# Patient Record
Sex: FEMALE | Race: WHITE | NOT HISPANIC OR LATINO | Employment: PART TIME | URBAN - NONMETROPOLITAN AREA
[De-identification: names, ages, dates, MRNs, and addresses within clinical notes are randomized per-mention and may not be internally consistent; named-entity substitution may affect disease eponyms.]

---

## 2022-10-06 ENCOUNTER — OFFICE VISIT (OUTPATIENT)
Dept: INTERNAL MEDICINE | Facility: CLINIC | Age: 27
End: 2022-10-06

## 2022-10-06 VITALS
DIASTOLIC BLOOD PRESSURE: 70 MMHG | WEIGHT: 144 LBS | TEMPERATURE: 97.3 F | BODY MASS INDEX: 22.6 KG/M2 | OXYGEN SATURATION: 99 % | HEART RATE: 88 BPM | HEIGHT: 67 IN | SYSTOLIC BLOOD PRESSURE: 124 MMHG

## 2022-10-06 DIAGNOSIS — F51.01 PRIMARY INSOMNIA: ICD-10-CM

## 2022-10-06 DIAGNOSIS — N92.1 MENORRHAGIA WITH IRREGULAR CYCLE: ICD-10-CM

## 2022-10-06 DIAGNOSIS — Z76.89 ENCOUNTER TO ESTABLISH CARE: Primary | ICD-10-CM

## 2022-10-06 DIAGNOSIS — Z11.59 NEED FOR HEPATITIS C SCREENING TEST: ICD-10-CM

## 2022-10-06 DIAGNOSIS — G44.209 TENSION HEADACHE: ICD-10-CM

## 2022-10-06 DIAGNOSIS — R07.1 CHEST PAIN VARYING WITH BREATHING: ICD-10-CM

## 2022-10-06 PROCEDURE — 99385 PREV VISIT NEW AGE 18-39: CPT

## 2022-10-06 RX ORDER — AMITRIPTYLINE HYDROCHLORIDE 10 MG/1
10 TABLET, FILM COATED ORAL NIGHTLY
Qty: 30 TABLET | Refills: 0 | Status: SHIPPED | OUTPATIENT
Start: 2022-10-06

## 2022-10-06 NOTE — PROGRESS NOTES
Subjective   Fernanda Vang is a 27 y.o. female.   Chief Complaint   Patient presents with   • Establish Care   • Annual Exam     Routine checkup        History of Present Illness   Fernanda presents here today to establish care.  She reports that she is fairly new to the area, moved here from [place] about a year ago.  She reports no significant childhood illnesses or conditions, denies any current medical diagnoses.  Reports that her father is in the medical field and as a child she was given an inhaler to use  She states that she does work out pretty regularly however mostly lifts weights, does about 15 minutes of cardio weekly with activities such as running, swimming.  She states that when she does this she's experiencing a ache in her trap area, left upper lung.  This does not occur when she is lifting weights, she does weight work with upper body and never has any problems.  She denies any overt chest pains or shortness of air that seems out of the ordinary while doing cardio.  She mentions that she has had insomnia issues for quite some time however in the last year more so.  She states that her problem is falling asleep, typically takes 30 minutes to an hour, some nights she is up all night.  She states that she will sometimes wake up anxiously, most recent episode she was searching around in the bed thinking that there was something in the bed.  She has noted increased issues with insomnia when work is especially stressful or she has a big event coming up.  She admits to sleepwalking as a child.  She has tried melatonin in the past and it does not seem to help and also makes her limbs feel tingly.  She utilizes Benadryl only if experiencing sinus issues.  Does admit to having fairly often occurrences of headaches, is more severe when she is having allergy issues and increased sinus pressure, has also noted can occur with dehydration or lack of food, will occasionally have a headache that lasts about 3 days,  "does not feel that it is exactly a migraine as she has a father that had severe migraines and she notes that is not this bad.  She also mentions that she would like to get her hormones checked, does take a \"bio-cherie\" supplement to help with menstrual issues such as cramping and breast tenderness, states that she is fairly regular.  Reports that she had a Pap about a year and a half ago that was normal.  Denies any other issues or concerns.    The following portions of the patient's history were reviewed and updated as appropriate: allergies, current medications, past family history, past medical history, past social history, past surgical history and problem list.    Review of Systems    Objective   Past Medical History:   Diagnosis Date   • Headache       History reviewed. No pertinent surgical history.     Current Outpatient Medications:   •  amitriptyline (ELAVIL) 10 MG tablet, Take 1 tablet by mouth Every Night., Disp: 30 tablet, Rfl: 0      /70 (BP Location: Left arm, Patient Position: Sitting, Cuff Size: Adult)   Pulse 88   Temp 97.3 °F (36.3 °C) (Temporal)   Ht 170.2 cm (67\")   Wt 65.3 kg (144 lb)   LMP 09/20/2022 (Exact Date)   SpO2 99%   Breastfeeding No   BMI 22.55 kg/m²      Body mass index is 22.55 kg/m².  BMI is within normal parameters. No other follow-up for BMI required.       Physical Exam  Vitals and nursing note reviewed.   Constitutional:       General: She is not in acute distress.     Appearance: Normal appearance. She is normal weight. She is not ill-appearing, toxic-appearing or diaphoretic.   HENT:      Head: Normocephalic and atraumatic.      Right Ear: Tympanic membrane, ear canal and external ear normal. There is no impacted cerumen.      Left Ear: Tympanic membrane, ear canal and external ear normal. There is no impacted cerumen.      Nose: Nose normal. No congestion or rhinorrhea.      Mouth/Throat:      Mouth: Mucous membranes are moist.      Pharynx: Oropharynx is clear. " No oropharyngeal exudate or posterior oropharyngeal erythema.   Eyes:      General:         Right eye: No discharge.         Left eye: No discharge.      Extraocular Movements: Extraocular movements intact.      Conjunctiva/sclera: Conjunctivae normal.      Pupils: Pupils are equal, round, and reactive to light.   Neck:      Thyroid: No thyroid mass, thyromegaly or thyroid tenderness.      Vascular: No carotid bruit.   Cardiovascular:      Rate and Rhythm: Normal rate and regular rhythm.      Pulses: Normal pulses.      Heart sounds: Normal heart sounds.   Pulmonary:      Effort: Pulmonary effort is normal. No respiratory distress.      Breath sounds: Normal breath sounds. No wheezing or rales.   Abdominal:      General: Bowel sounds are normal.      Palpations: Abdomen is soft.   Musculoskeletal:         General: Normal range of motion.      Cervical back: Normal range of motion and neck supple. No rigidity or tenderness.      Right lower leg: No edema.      Left lower leg: No edema.   Lymphadenopathy:      Cervical: No cervical adenopathy.   Skin:     General: Skin is warm and dry.      Capillary Refill: Capillary refill takes less than 2 seconds.      Findings: No lesion or rash.   Neurological:      General: No focal deficit present.      Mental Status: She is alert and oriented to person, place, and time. Mental status is at baseline.      Sensory: No sensory deficit.      Motor: No weakness.      Coordination: Coordination normal.   Psychiatric:         Mood and Affect: Mood normal.         Behavior: Behavior normal.         Thought Content: Thought content normal.         Judgment: Judgment normal.               Assessment & Plan   Diagnoses and all orders for this visit:    1. Encounter to establish care (Primary)  -     TSH; Future  -     Comprehensive Metabolic Panel  -     CBC & Differential  -     Lipid Panel  -     Uric Acid  -     Urinalysis With Microscopic - Urine, Clean Catch    2. Menorrhagia with  irregular cycle  -     Estradiol; Future  -     Follicle stimulating hormone; Future  -     Luteinizing hormone; Future  -     Prolactin; Future  -     Testosterone Free Direct; Future  -     TSH; Future    3. Tension headache  -     amitriptyline (ELAVIL) 10 MG tablet; Take 1 tablet by mouth Every Night.  Dispense: 30 tablet; Refill: 0    4. Primary insomnia  -     amitriptyline (ELAVIL) 10 MG tablet; Take 1 tablet by mouth Every Night.  Dispense: 30 tablet; Refill: 0    5. Chest pain varying with breathing  -     Full Pulmonary Function Test With Bronchodilator; Future    6. Need for hepatitis C screening test  -     Hepatitis C Antibody               Plan of care reviewed with Fernanda  We will proceed with getting baseline labs as well as hormonal labs, will communicate results as they are available.  Blood pressure today is 124/70, heart rate 88  We will trial Elavil and a low-dose, she will communicate results via [employer] message or or she can call the office.  I advise giving it 1 week, if no positive results please let me know and we will consider another formal logical therapy.  Elavil may also help with the headaches.  Will have a pulmonary function test performed given her complaints of discomfort while performing cardiovascular exercises and given reports of using inhaler as a child.  She consents to doing hepatitis C screening  For now we will schedule her for a year out for another annual physical, can always be seen before this as needed.

## 2022-10-07 ENCOUNTER — PATIENT ROUNDING (BHMG ONLY) (OUTPATIENT)
Dept: INTERNAL MEDICINE | Facility: CLINIC | Age: 27
End: 2022-10-07

## 2022-10-07 LAB
ALBUMIN SERPL-MCNC: 5 G/DL (ref 3.5–5.2)
ALBUMIN/GLOB SERPL: 2.6 G/DL
ALP SERPL-CCNC: 40 U/L (ref 39–117)
ALT SERPL-CCNC: 10 U/L (ref 1–33)
APPEARANCE UR: CLEAR
AST SERPL-CCNC: 19 U/L (ref 1–32)
BACTERIA #/AREA URNS HPF: ABNORMAL /HPF
BASOPHILS # BLD AUTO: 0.03 10*3/MM3 (ref 0–0.2)
BASOPHILS NFR BLD AUTO: 0.5 % (ref 0–1.5)
BILIRUB SERPL-MCNC: 0.5 MG/DL (ref 0–1.2)
BILIRUB UR QL STRIP: NEGATIVE
BUN SERPL-MCNC: 10 MG/DL (ref 6–20)
BUN/CREAT SERPL: 12.8 (ref 7–25)
CALCIUM SERPL-MCNC: 9.4 MG/DL (ref 8.6–10.5)
CASTS URNS MICRO: ABNORMAL
CHLORIDE SERPL-SCNC: 100 MMOL/L (ref 98–107)
CHOLEST SERPL-MCNC: 184 MG/DL (ref 0–200)
CO2 SERPL-SCNC: 26.4 MMOL/L (ref 22–29)
COLOR UR: YELLOW
CREAT SERPL-MCNC: 0.78 MG/DL (ref 0.57–1)
EGFRCR SERPLBLD CKD-EPI 2021: 106.9 ML/MIN/1.73
EOSINOPHIL # BLD AUTO: 0.11 10*3/MM3 (ref 0–0.4)
EOSINOPHIL NFR BLD AUTO: 2 % (ref 0.3–6.2)
EPI CELLS #/AREA URNS HPF: ABNORMAL /HPF
ERYTHROCYTE [DISTWIDTH] IN BLOOD BY AUTOMATED COUNT: 11.7 % (ref 12.3–15.4)
GLOBULIN SER CALC-MCNC: 1.9 GM/DL
GLUCOSE SERPL-MCNC: 90 MG/DL (ref 65–99)
GLUCOSE UR QL STRIP: NEGATIVE
HCT VFR BLD AUTO: 42 % (ref 34–46.6)
HDLC SERPL-MCNC: 58 MG/DL (ref 40–60)
HGB BLD-MCNC: 14.4 G/DL (ref 12–15.9)
HGB UR QL STRIP: NEGATIVE
IMM GRANULOCYTES # BLD AUTO: 0.01 10*3/MM3 (ref 0–0.05)
IMM GRANULOCYTES NFR BLD AUTO: 0.2 % (ref 0–0.5)
KETONES UR QL STRIP: NEGATIVE
LDLC SERPL CALC-MCNC: 113 MG/DL (ref 0–100)
LEUKOCYTE ESTERASE UR QL STRIP: NEGATIVE
LYMPHOCYTES # BLD AUTO: 2.19 10*3/MM3 (ref 0.7–3.1)
LYMPHOCYTES NFR BLD AUTO: 40.1 % (ref 19.6–45.3)
MCH RBC QN AUTO: 30 PG (ref 26.6–33)
MCHC RBC AUTO-ENTMCNC: 34.3 G/DL (ref 31.5–35.7)
MCV RBC AUTO: 87.5 FL (ref 79–97)
MONOCYTES # BLD AUTO: 0.49 10*3/MM3 (ref 0.1–0.9)
MONOCYTES NFR BLD AUTO: 9 % (ref 5–12)
NEUTROPHILS # BLD AUTO: 2.63 10*3/MM3 (ref 1.7–7)
NEUTROPHILS NFR BLD AUTO: 48.2 % (ref 42.7–76)
NITRITE UR QL STRIP: NEGATIVE
NRBC BLD AUTO-RTO: 0 /100 WBC (ref 0–0.2)
PH UR STRIP: 7.5 [PH] (ref 5–8)
PLATELET # BLD AUTO: 238 10*3/MM3 (ref 140–450)
POTASSIUM SERPL-SCNC: 4.3 MMOL/L (ref 3.5–5.2)
PROT SERPL-MCNC: 6.9 G/DL (ref 6–8.5)
PROT UR QL STRIP: NEGATIVE
RBC # BLD AUTO: 4.8 10*6/MM3 (ref 3.77–5.28)
RBC #/AREA URNS HPF: ABNORMAL /HPF
SODIUM SERPL-SCNC: 137 MMOL/L (ref 136–145)
SP GR UR STRIP: 1.01 (ref 1–1.03)
TRIGL SERPL-MCNC: 70 MG/DL (ref 0–150)
URATE SERPL-MCNC: 4.7 MG/DL (ref 2.4–5.7)
UROBILINOGEN UR STRIP-MCNC: NORMAL MG/DL
VLDLC SERPL CALC-MCNC: 13 MG/DL (ref 5–40)
WBC # BLD AUTO: 5.46 10*3/MM3 (ref 3.4–10.8)
WBC #/AREA URNS HPF: ABNORMAL /HPF

## 2022-10-07 NOTE — PROGRESS NOTES
October 7, 2022    Hello, may I speak with Fernanda Vang?    My name is Laxmi    I am  with [facility]  [address] [place] [address] [address] [place] [address] [phone].    Before we get started may I verify your date of birth? 1995    I am calling to officially welcome you to our practice and ask about your recent visit. Is this a good time to talk? No      Patient did not answer, no voicemail available.        Thank you, and have a great day.

## 2022-10-07 NOTE — PROGRESS NOTES
It is my fault but the hormonal labs were put in for future, thus they were not drawn, will need to come back in at her convenience to have these done.  Urinalysis is completely clear, microscopic does note 1+ bacteria, I want to ensure there are no symptoms of UTI?  If she does have any symptoms such as increased frequency, pelvic discomfort, dysuria, or flank pain please have her leave a urine with culture when she returns.  Uric acid is normal  Total cholesterol is normal, there is a slight elevation in LDL, HDL is in great range, would continue with current physical activity level, would encourage avoidance of excessive intake of red meats, fried foods and fast foods.  CBC is within normal range, RDW is slightly low but not significantly so.  Metabolic panel is completely normal range.

## 2022-11-22 ENCOUNTER — APPOINTMENT (OUTPATIENT)
Dept: PULMONOLOGY | Facility: HOSPITAL | Age: 27
End: 2022-11-22

## 2023-10-13 ENCOUNTER — LAB (OUTPATIENT)
Dept: INTERNAL MEDICINE | Facility: CLINIC | Age: 28
End: 2023-10-13
Payer: COMMERCIAL

## 2023-10-13 DIAGNOSIS — Z11.59 NEED FOR HEPATITIS C SCREENING TEST: ICD-10-CM

## 2023-10-13 DIAGNOSIS — Z00.00 WELLNESS EXAMINATION: ICD-10-CM

## 2023-10-14 LAB
ALBUMIN SERPL-MCNC: 4.7 G/DL (ref 3.5–5.2)
ALBUMIN/GLOB SERPL: 2.4 G/DL
ALP SERPL-CCNC: 41 U/L (ref 39–117)
ALT SERPL-CCNC: 11 U/L (ref 1–33)
APPEARANCE UR: CLEAR
AST SERPL-CCNC: 17 U/L (ref 1–32)
BACTERIA #/AREA URNS HPF: ABNORMAL /HPF
BASOPHILS # BLD AUTO: 0.03 10*3/MM3 (ref 0–0.2)
BASOPHILS NFR BLD AUTO: 0.6 % (ref 0–1.5)
BILIRUB SERPL-MCNC: 0.4 MG/DL (ref 0–1.2)
BILIRUB UR QL STRIP: NEGATIVE
BUN SERPL-MCNC: 8 MG/DL (ref 6–20)
BUN/CREAT SERPL: 10.8 (ref 7–25)
CALCIUM SERPL-MCNC: 9.6 MG/DL (ref 8.6–10.5)
CASTS URNS MICRO: ABNORMAL
CHLORIDE SERPL-SCNC: 103 MMOL/L (ref 98–107)
CHOLEST SERPL-MCNC: 175 MG/DL (ref 0–200)
CO2 SERPL-SCNC: 24.4 MMOL/L (ref 22–29)
COLOR UR: YELLOW
CREAT SERPL-MCNC: 0.74 MG/DL (ref 0.57–1)
EGFRCR SERPLBLD CKD-EPI 2021: 113.2 ML/MIN/1.73
EOSINOPHIL # BLD AUTO: 0.12 10*3/MM3 (ref 0–0.4)
EOSINOPHIL NFR BLD AUTO: 2.3 % (ref 0.3–6.2)
EPI CELLS #/AREA URNS HPF: ABNORMAL /HPF
ERYTHROCYTE [DISTWIDTH] IN BLOOD BY AUTOMATED COUNT: 11.6 % (ref 12.3–15.4)
GLOBULIN SER CALC-MCNC: 2 GM/DL
GLUCOSE SERPL-MCNC: 84 MG/DL (ref 65–99)
GLUCOSE UR QL STRIP: NEGATIVE
HCT VFR BLD AUTO: 43.1 % (ref 34–46.6)
HCV IGG SERPL QL IA: NON REACTIVE
HDLC SERPL-MCNC: 51 MG/DL (ref 40–60)
HGB BLD-MCNC: 14.6 G/DL (ref 12–15.9)
HGB UR QL STRIP: NEGATIVE
IMM GRANULOCYTES # BLD AUTO: 0.01 10*3/MM3 (ref 0–0.05)
IMM GRANULOCYTES NFR BLD AUTO: 0.2 % (ref 0–0.5)
KETONES UR QL STRIP: NEGATIVE
LDLC SERPL CALC-MCNC: 113 MG/DL (ref 0–100)
LEUKOCYTE ESTERASE UR QL STRIP: NEGATIVE
LYMPHOCYTES # BLD AUTO: 1.94 10*3/MM3 (ref 0.7–3.1)
LYMPHOCYTES NFR BLD AUTO: 37.5 % (ref 19.6–45.3)
MCH RBC QN AUTO: 30 PG (ref 26.6–33)
MCHC RBC AUTO-ENTMCNC: 33.9 G/DL (ref 31.5–35.7)
MCV RBC AUTO: 88.5 FL (ref 79–97)
MONOCYTES # BLD AUTO: 0.4 10*3/MM3 (ref 0.1–0.9)
MONOCYTES NFR BLD AUTO: 7.7 % (ref 5–12)
NEUTROPHILS # BLD AUTO: 2.67 10*3/MM3 (ref 1.7–7)
NEUTROPHILS NFR BLD AUTO: 51.7 % (ref 42.7–76)
NITRITE UR QL STRIP: NEGATIVE
NRBC BLD AUTO-RTO: 0 /100 WBC (ref 0–0.2)
PH UR STRIP: 7.5 [PH] (ref 5–8)
PLATELET # BLD AUTO: 273 10*3/MM3 (ref 140–450)
POTASSIUM SERPL-SCNC: 4 MMOL/L (ref 3.5–5.2)
PROT SERPL-MCNC: 6.7 G/DL (ref 6–8.5)
PROT UR QL STRIP: NEGATIVE
RBC # BLD AUTO: 4.87 10*6/MM3 (ref 3.77–5.28)
RBC #/AREA URNS HPF: ABNORMAL /HPF
SODIUM SERPL-SCNC: 137 MMOL/L (ref 136–145)
SP GR UR STRIP: 1.01 (ref 1–1.03)
TRIGL SERPL-MCNC: 57 MG/DL (ref 0–150)
TSH SERPL DL<=0.005 MIU/L-ACNC: 1.44 UIU/ML (ref 0.27–4.2)
UROBILINOGEN UR STRIP-MCNC: NORMAL MG/DL
VLDLC SERPL CALC-MCNC: 11 MG/DL (ref 5–40)
WBC # BLD AUTO: 5.17 10*3/MM3 (ref 3.4–10.8)
WBC #/AREA URNS HPF: ABNORMAL /HPF

## 2023-11-06 ENCOUNTER — OFFICE VISIT (OUTPATIENT)
Dept: INTERNAL MEDICINE | Facility: CLINIC | Age: 28
End: 2023-11-06
Payer: COMMERCIAL

## 2023-11-06 VITALS
WEIGHT: 142.8 LBS | OXYGEN SATURATION: 98 % | SYSTOLIC BLOOD PRESSURE: 118 MMHG | DIASTOLIC BLOOD PRESSURE: 72 MMHG | HEART RATE: 95 BPM | BODY MASS INDEX: 22.41 KG/M2 | HEIGHT: 67 IN | TEMPERATURE: 97.5 F

## 2023-11-06 DIAGNOSIS — G44.209 TENSION HEADACHE: ICD-10-CM

## 2023-11-06 DIAGNOSIS — Z00.00 ENCOUNTER FOR ANNUAL PHYSICAL EXAM: Primary | ICD-10-CM

## 2023-11-06 DIAGNOSIS — M53.3 CHRONIC RIGHT SI JOINT PAIN: ICD-10-CM

## 2023-11-06 DIAGNOSIS — F41.9 ANXIETY: ICD-10-CM

## 2023-11-06 DIAGNOSIS — Z12.4 ENCOUNTER FOR PAPANICOLAOU SMEAR OF CERVIX: ICD-10-CM

## 2023-11-06 DIAGNOSIS — R20.8 BURNING SENSATION OF LOWER EXTREMITY: ICD-10-CM

## 2023-11-06 DIAGNOSIS — F51.01 PRIMARY INSOMNIA: ICD-10-CM

## 2023-11-06 DIAGNOSIS — I83.90 VARICOSE VEINS OF CALF: ICD-10-CM

## 2023-11-06 DIAGNOSIS — G89.29 CHRONIC RIGHT SI JOINT PAIN: ICD-10-CM

## 2023-11-06 RX ORDER — HYDROXYZINE HYDROCHLORIDE 25 MG/1
25 TABLET, FILM COATED ORAL NIGHTLY PRN
Qty: 30 TABLET | Refills: 1 | Status: SHIPPED | OUTPATIENT
Start: 2023-11-06

## 2023-11-06 RX ORDER — AMITRIPTYLINE HYDROCHLORIDE 10 MG/1
20 TABLET, FILM COATED ORAL NIGHTLY
Qty: 90 TABLET | Refills: 1 | Status: SHIPPED | OUTPATIENT
Start: 2023-11-06

## 2023-11-06 NOTE — PROGRESS NOTES
Subjective   Fernanda Vang is a 28 y.o. female.   Chief Complaint   Patient presents with    Annual Exam     Labs printed     Referral     Patient is requesting a referral for PT due to R hip pain that has been present for a year. States the pain would occur with exertion.   States symptoms have improved but states if she has an episode again she would like to trial PT.     Med Refill     Patient would like a refill on Elavil.     Varicose Veins     Patient complains of varicose veins located behind her L knee.   States she has trialed compression stockings and this will give some relief.     Gynecologic Exam       History of Present Illness   Fernanda presents today for annual wellness exam.  She has various issues/concerns she would like to address today.  She explains that in the last year she has had recurrent right-sided SI nerve pain, typically triggered by certain exercises, works out frequently, exercises such as RDLs, or being on her legs for an extended period of time seem to trigger it.  She has had little to no therapeutic effect with chiropractic intervention, has noted some improvement with massage, she would be interested in trying physical therapy.    She states that she has continued to have issues with insomnia, states that she never really gave the Elavil a continuous time period to see if this would help, she would like this prescription renewed with the option of taking an additional dosage if needed to help with her insomnia.  She is also previously had issues with tension headaches of which this has been therapeutic for her.  She also mentions that if she goes several nights without sleeping well she has increased anxiety about not being able to sleep well, is wondering if she could have hydroxyzine as needed for treatment of this.    She states that since working at her current job she has been on her legs for more extended periods of time than usual, as result she has noted varicose veins on  "bilateral lower extremities, they are painful at times and cause a burning sensation.  She has noted some improvement by using a compressive knee device that she wears during the day but will be interested to see if there are any further treatment options via vascular surgery.    She also reports that she is overdue for Pap and is wondering if this could be completed today.  She denies any current gynecological issues or concerns.  She reports family history of breast cancer in grandmother.    The following portions of the patient's history were reviewed and updated as appropriate: allergies, current medications, past family history, past medical history, past social history, past surgical history and problem list.    Review of Systems    Objective   Past Medical History:   Diagnosis Date    Headache       History reviewed. No pertinent surgical history.     Current Outpatient Medications:     amitriptyline (ELAVIL) 10 MG tablet, Take 2 tablets by mouth Every Night., Disp: 90 tablet, Rfl: 1    hydrOXYzine (ATARAX) 25 MG tablet, Take 1 tablet by mouth At Night As Needed for Anxiety., Disp: 30 tablet, Rfl: 1      /72 (BP Location: Left arm, Patient Position: Sitting, Cuff Size: Adult)   Pulse 95   Temp 97.5 °F (36.4 °C) (Temporal)   Ht 170.2 cm (67\")   Wt 64.8 kg (142 lb 12.8 oz)   SpO2 98%   BMI 22.37 kg/m²      Body mass index is 22.37 kg/m².  BMI is within normal parameters. No other follow-up for BMI required.       Physical Exam  Vitals and nursing note reviewed. Exam conducted with a chaperone present.   Constitutional:       General: She is not in acute distress.     Appearance: Normal appearance. She is normal weight. She is not ill-appearing, toxic-appearing or diaphoretic.   HENT:      Head: Normocephalic and atraumatic.      Right Ear: Tympanic membrane, ear canal and external ear normal. There is no impacted cerumen.      Left Ear: Tympanic membrane, ear canal and external ear normal. There is " no impacted cerumen.      Nose: Nose normal.   Eyes:      Extraocular Movements: Extraocular movements intact.      Conjunctiva/sclera: Conjunctivae normal.      Pupils: Pupils are equal, round, and reactive to light.   Neck:      Thyroid: No thyroid mass, thyromegaly or thyroid tenderness.      Vascular: No carotid bruit.   Cardiovascular:      Rate and Rhythm: Normal rate and regular rhythm.      Pulses: Normal pulses.      Heart sounds: Normal heart sounds.   Pulmonary:      Effort: Pulmonary effort is normal.      Breath sounds: Normal breath sounds.   Chest:   Breasts:     Breasts are symmetrical.      Right: Normal.      Left: Normal.   Abdominal:      General: Bowel sounds are normal.      Palpations: Abdomen is soft.      Hernia: There is no hernia in the left inguinal area or right inguinal area.   Genitourinary:     Exam position: Lithotomy position.      Labia:         Right: No rash, tenderness, lesion or injury.         Left: No rash, tenderness, lesion or injury.       Urethra: No prolapse, urethral pain, urethral swelling or urethral lesion.      Vagina: Normal.      Cervix: Normal.      Uterus: Normal.       Adnexa: Right adnexa normal and left adnexa normal.      Rectum: Normal.   Musculoskeletal:         General: No tenderness (SI joint not bothering today). Normal range of motion.      Cervical back: Normal range of motion and neck supple.      Right lower leg: No edema.      Left lower leg: No edema.   Lymphadenopathy:      Upper Body:      Right upper body: No supraclavicular, axillary or pectoral adenopathy.      Left upper body: No supraclavicular, axillary or pectoral adenopathy.      Lower Body: No right inguinal adenopathy. No left inguinal adenopathy.   Skin:     General: Skin is warm and dry.      Capillary Refill: Capillary refill takes less than 2 seconds.      Comments: Apparent varicose veins bilateral lower extremities   Neurological:      General: No focal deficit present.       Mental Status: She is alert and oriented to person, place, and time. Mental status is at baseline.      Motor: No weakness.      Gait: Gait normal.   Psychiatric:         Mood and Affect: Mood normal.         Behavior: Behavior normal.         Thought Content: Thought content normal.         Judgment: Judgment normal.               Assessment & Plan   Diagnoses and all orders for this visit:    1. Encounter for annual physical exam (Primary)    2. Primary insomnia  -     amitriptyline (ELAVIL) 10 MG tablet; Take 2 tablets by mouth Every Night.  Dispense: 90 tablet; Refill: 1    3. Tension headache  -     amitriptyline (ELAVIL) 10 MG tablet; Take 2 tablets by mouth Every Night.  Dispense: 90 tablet; Refill: 1    4. Anxiety  -     hydrOXYzine (ATARAX) 25 MG tablet; Take 1 tablet by mouth At Night As Needed for Anxiety.  Dispense: 30 tablet; Refill: 1    5. Chronic right SI joint pain  -     Ambulatory Referral to Physical Therapy    6. Encounter for Papanicolaou smear of cervix  -     IGP,rfx Aptima HPV All Pth    7. Varicose veins of calf  -     Ambulatory Referral to Vascular Surgery    8. Burning sensation of lower extremity  -     Ambulatory Referral to Vascular Surgery               Plan of care and recent lab results reviewed with Fernanda  Overall labs are unremarkable, slight elevation in LDL, recommended intake of saturated fats to be limited, continue to incorporate adequate amounts of healthy fat and routine exercise.  We will proceed with trialing the Elavil up to 20 mg nightly for treatment of insomnia as well as tension headaches, she has been encouraged to reach out sooner rather than later if therapy is subtherapeutic or if there is any side effects that are bothersome such as brain fog in the morning, dry mouth etc.  May utilize hydroxyzine as needed for anxiety  We will proceed with referral to physical therapy for additional evaluation and management of right SI joint pain, I would also recommend  continue with current measures which include massage therapy.  Overall exam was unremarkable, I did recommend performing self breast exams once monthly.  We will communicate results of Pap once available.  Continue with routine orthodontic and ophthalmology exams  Continue with use of sunscreen when exposed to prolonged sunlight, she did request referral to dermatology for overall skin evaluation, she was given a list of dermatologist available in [place], advised of at least 3 months wait time likely.  Will refer to vascular surgery Dr. Forman for additional evaluation and management of varicose veins of which she is symptomatic with intermittent burning.  Encouraged to return to the office as needed, for questions/concerns via MyChart as well, otherwise return in 1 year for annual physical.    Please note that portions of this note were completed with a voice recognition program.     Electronically signed by JOSE Savage, 11/06/23, 11:20 CST.

## 2023-11-09 LAB
CONV .: NORMAL
CYTOLOGIST CVX/VAG CYTO: NORMAL
CYTOLOGY CVX/VAG DOC CYTO: NORMAL
CYTOLOGY CVX/VAG DOC THIN PREP: NORMAL
DX ICD CODE: NORMAL
HIV 1 & 2 AB SER-IMP: NORMAL
OTHER STN SPEC: NORMAL
STAT OF ADQ CVX/VAG CYTO-IMP: NORMAL

## 2023-12-07 DIAGNOSIS — F41.9 ANXIETY: ICD-10-CM

## 2023-12-08 RX ORDER — HYDROXYZINE HYDROCHLORIDE 25 MG/1
25 TABLET, FILM COATED ORAL NIGHTLY PRN
Qty: 90 TABLET | Refills: 3 | Status: SHIPPED | OUTPATIENT
Start: 2023-12-08

## 2024-01-19 DIAGNOSIS — F51.01 PRIMARY INSOMNIA: Primary | ICD-10-CM

## 2024-01-19 RX ORDER — TRAZODONE HYDROCHLORIDE 50 MG/1
50 TABLET ORAL NIGHTLY
Qty: 30 TABLET | Refills: 0 | Status: SHIPPED | OUTPATIENT
Start: 2024-01-19

## 2024-10-02 DIAGNOSIS — F41.9 ANXIETY: ICD-10-CM

## 2024-10-02 RX ORDER — HYDROXYZINE HYDROCHLORIDE 25 MG/1
25 TABLET, FILM COATED ORAL NIGHTLY PRN
Qty: 90 TABLET | Refills: 3 | Status: SHIPPED | OUTPATIENT
Start: 2024-10-02

## 2024-10-08 DIAGNOSIS — F41.9 ANXIETY: ICD-10-CM

## 2024-10-09 RX ORDER — HYDROXYZINE HYDROCHLORIDE 25 MG/1
25 TABLET, FILM COATED ORAL NIGHTLY PRN
Qty: 90 TABLET | Refills: 3 | OUTPATIENT
Start: 2024-10-09

## 2024-10-29 ENCOUNTER — E-VISIT (OUTPATIENT)
Dept: FAMILY MEDICINE CLINIC | Facility: TELEHEALTH | Age: 29
End: 2024-10-29

## 2024-10-29 PROCEDURE — FABRICHEALTHVISIT: Performed by: NURSE PRACTITIONER

## 2024-10-29 NOTE — E-VISIT TREATED
Date: 10/29/2024 05:49:35  Clinician: Madison Clark  Clinician NPI: [id number]  Patient: Fernanda Vang  Patient : 1995  Patient Address: [facility] [address], [place]  Patient Phone: [phone]  Visit Protocol: UTI  Patient Summary:  Fernanda is a 29 year old ( : 1995 ) female who initiated a visit for a presumed bladder infection.    The symptoms started today and consist of chills, feeling as if the bladder is never empty, foul-smelling urine, urgency,   urinary frequency, and dysuria. Fernanda also feels feverish.   Symptom details     Urine color: Pink or red     Temperature: Current temperature is 98.5 degrees Fahrenheit.      Denied symptoms include urinary incontinence, vaginal itching, abdominal pain, vaginal discharge, vomiting, flank pain, and nausea.   Fernanda has not used any over-the-counter medications or home remedies to relieve the current symptoms.  Precipitating   events  Fernanda denies having a sexually transmitted disease.  Pertinent medical history  Fernanda has had a bladder infection before but has not had any in the past 12 months. The current symptoms are similar to previous bladder infection symptoms.   Fernanda is   not sure what antibiotics have been effective in treating past bladder infections.   Fernanda does not get a yeast infection when taking antibiotics.   Fernanda does not get yeast infections when taking antibiotics and has not been prescribed antibiotics to   prevent frequent or repeated bladder infections in the past. Fernanda has not experienced problems or side effects with any of the common antibiotics used to treat bladder infections.   Fernanda has not had a procedure or surgery done to the urinary tract. Fernanda   has not been diagnosed with advanced kidney disease.   Fernanda has not used a catheter and denies being a patient in a hospital or nursing home in the past 2 weeks. Fernanda does not have diabetes. Immunosuppressive conditions (e.g., chemotherapy, HIV, organ   transplant,  long-term use of steroids or other immunosuppressive medications, splenectomy) were denied.   Fernanda does not smoke or use smokeless tobacco. Fernanda does not vape or use other e-cigarette products.   Fernanda denies pregnancy and denies   breastfeeding.     MEDICATIONS: hydroxyzine HCl oral, ALLERGIES: NKDA  Clinician Response:  Dear Fernanda,  Based on the information you have provided, you have an acute urinary tract infection, also called a bladder infection. Bladder infections occur when bacteria from the outside of the body enters the urinary tract. Any   part of the urinary system can be infected, but the bladder is the most common.  Medication information  I am prescribing:       Cephalexin (Keflex) 500 mg oral capsule. Take 1 capsule by mouth every 12 hours for 7 days. There are no refills with this prescription.      Phenazopyridine (Pyridium) 200 mg oral tablet. Take 1 tablet by mouth 3 times per day for up to 2 days as needed. Take the tablets with a full glass of water after a meal. There are no refills with this prescription. This medication may also be purchased   over-the-counter.     The medication I prescribed for your bladder infection is an antibiotic. Continue taking the medication until it is gone even if you feel better. If you get an upset stomach while taking antibiotics, taking the medication with food can help.   Yeast   infections can be a common side effect of antibiotics. The most common symptom of a yeast infection is itchiness in and around the vagina. Other signs and symptoms include burning, redness of the vulva (the outer part of the female genitals), or a thick,   white vaginal discharge that looks like cottage cheese and does not have a bad smell.  Self care  Urination helps to flush bacteria from the urinary tract. For this reason, drinking water and urinating often helps relieve some urinary symptoms and can   decrease your risk of getting bladder infections in the future.  Other steps you  can take to prevent future bladder infections include:     Wipe front to back after using the bathroom    Urinate after sexual intercourse    Avoid using deodorant sprays, douches, or powders in the vaginal area     When to seek care  Please make an appointment to be seen in a clinic or urgent care if any of the following occur:     You develop new symptoms or your symptoms become worse    You have medication side effects that make it difficult to take them as prescribed    Your symptoms do not improve within 1-2 days of starting treatment    You have symptoms of a bladder infection that return shortly after completing treatment     It is possible to have an allergic reaction to an antibiotic even if you have not had one in the past. If you notice a new rash, significant swelling, or difficulty breathing, stop taking this medication immediately and go to a clinic or urgent care.   Diagnosis: Acute uncomplicated bladder infection  Diagnosis ICD: N39.0    Follow up instructions: ATTENTION: If you have been prescribed medications, your prescriptions will not be sent until you choose your pharmacy.  To do so open the link within your notification, or go to [employer] and click eVisit in the menu to open your   treatment plan. From there, you can select your pharmacy at the bottom of your after visit summary. You can also go to [email or web address]  Additional Clinician Notes:  If you are currently taking AZO, please stop this medication as I have prescribed Pyridium which is the same medication only a stronger dose.   Prescriptions  Prescription: cephalexin (Keflex) 500 mg oral capsule, take 1 capsule by mouth every 12 hours for 7 days  Sent To: [facility] #[id number] - [phone] - [address] [place] [address]  Prescription: phenazopyridine (Pyridium) 200 mg oral tablet, take 1 tablet by mouth 3 times per day for up to 2 days as needed  Sent To: [facility] #[id number]  - [phone] - [address],  [place] [address]

## 2025-01-07 ENCOUNTER — E-VISIT (OUTPATIENT)
Dept: FAMILY MEDICINE CLINIC | Facility: TELEHEALTH | Age: 30
End: 2025-01-07
Payer: COMMERCIAL

## 2025-01-07 PROCEDURE — FABRICHEALTHVISIT: Performed by: NURSE PRACTITIONER

## 2025-01-07 NOTE — E-VISIT TREATED
Date: 2025 10:07:59  Clinician: Katlin Skaggs  Clinician NPI: [id number]  Patient: Fernanda Vang  Patient : 1995  Patient Address: [facility] [address], [place] [address]  Patient Phone: [phone]  Visit Protocol: UTI  Patient Summary:  Fernanda is a 29 year old ( : 1995 ) female who initiated a visit for a presumed bladder infection.    The symptoms started 1-3 days ago and consist of urgency, urinary frequency, dysuria, feeling as if the bladder is never empty,   and foul-smelling urine.   Symptom details   Urine color: Pink or red    Denied symptoms include urinary incontinence, vaginal itching, abdominal pain, vaginal discharge, vomiting, nausea, and chills. Fernanda denies flank pain. Fernanda does not feel feverish.     Fernanda has used over-the-counter medications or home remedies to relieve the current symptoms.  Precipitating events  Fernanda denies having a sexually transmitted infection.  Pertinent medical history  Fernanda has had a bladder infection before and has had 2   in the past 12 months. The most recent bladder infection was not within the last 4 weeks. The current symptoms are similar to previous bladder infection symptoms.   Fernanda is not sure what antibiotics have been effective in treating past bladder   infections.   Fernanda typically gets a yeast infection when taking antibiotics. Fernanda has successfully used Terconazole vaginal suppository to treat previous yeast infections.   Fernanda has not been prescribed antibiotics to prevent frequent or repeated bladder   infections in the past. Fernanda has not experienced problems or side effects with any of the common antibiotics used to treat bladder infections.   Fernanda has not had a procedure or surgery done to the urinary tract. Fernanda has not been diagnosed with advanced   kidney disease.   Fernanda has not used a catheter and denies being a patient in a hospital or nursing home in the past 2 weeks. Fernanda does not have diabetes. Immunosuppressive  conditions (e.g., chemotherapy, HIV, organ transplant, long-term use of steroids   or other immunosuppressive medications, splenectomy) were denied.   Fernanda does not smoke or use smokeless tobacco. Fernanda does not vape or use other e-cigarette products.   Fernanda denies pregnancy and denies breastfeeding.     MEDICATIONS: hydroxyzine HCl oral, ALLERGIES: NKDA  Clinician Response:  Dear Fernanda,  Based on the information you have provided, you have an acute urinary tract infection, also called a bladder infection. Bladder infections occur when bacteria from the outside of the body enters the urinary tract. Any   part of the urinary system can be infected, but the bladder is the most common.  Medication information  I am prescribing:       Nitrofurantoin monohyd/m-cryst (Macrobid) 100 mg oral capsule. Take 1 capsule by mouth every 12 hours for 5 days. Take this medication with food. There are no refills with this prescription.      Phenazopyridine (Pyridium) 200 mg oral tablet. Take 1 tablet by mouth 3 times per day for up to 2 days as needed. Take the tablets with a full glass of water after a meal. There are no refills with this prescription. This medication may also be purchased   over-the-counter.     The medication I prescribed for your bladder infection is an antibiotic. Continue taking the medication until it is gone even if you feel better.   Because you usually get a yeast infection when taking antibiotics, I am also prescribing:     Terconazole 80 mg vaginal suppository. Insert 1 suppository into vagina 1 time per day at bedtime for 3 days. There are no refills with this prescription.   Self care  Urination helps to flush bacteria from the urinary tract. For this reason, drinking   water and urinating often helps relieve some urinary symptoms and can decrease your risk of getting bladder infections in the future.  Other steps you can take to prevent future bladder infections include:     Wipe front to back after  using the bathroom    Urinate after sexual intercourse    Avoid using deodorant sprays, douches, or powders in the vaginal area     When to seek care  Please make an appointment to be seen in a clinic or urgent care if any of the following occur:     You develop new symptoms or your symptoms become worse    You have medication side effects that make it difficult to take them as prescribed    Your symptoms do not improve within 1-2 days of starting treatment    You have symptoms of a bladder infection that return shortly after completing treatment     It is possible to have an allergic reaction to an antibiotic even if you have not had one in the past. If you notice a new rash, significant swelling, or difficulty breathing, stop taking this medication immediately and go to a clinic or urgent care.   Diagnosis: Acute uncomplicated bladder infection  Diagnosis ICD: N39.0    Follow up instructions: ATTENTION: If you have been prescribed medications, your prescriptions will not be sent until you choose your pharmacy.  To do so open the link within your notification, or go to [employer] and click eVisit in the menu to open your   treatment plan. From there, you can select your pharmacy at the bottom of your after visit summary. You can also go to [email or web address]  Prescriptions  Prescription: terconazole 80 mg vaginal suppository, insert 1 suppository into vagina 1 time per day at bedtime for 3 days  Sent To: [facility] #[id number] - [id number] [address] [place] [address]  Prescription: nitrofurantoin monohyd/m-cryst (Macrobid) 100 mg oral capsule, take 1 capsule by mouth every 12 hours for 5 days  Sent To: [facility] #[id number] - [phone] [address] [place] [address]  Prescription: phenazopyridine (Pyridium) 200 mg oral tablet, take 1 tablet by mouth 3 times per day for up to 2 days as needed  Sent To: [facility] #[id number] - [phone] -  [address],  [place] [address]

## 2025-02-11 DIAGNOSIS — F41.9 ANXIETY: ICD-10-CM

## 2025-02-11 RX ORDER — HYDROXYZINE HYDROCHLORIDE 25 MG/1
25 TABLET, FILM COATED ORAL NIGHTLY PRN
Qty: 90 TABLET | Refills: 3 | OUTPATIENT
Start: 2025-02-11

## 2025-02-26 ENCOUNTER — CLINICAL SUPPORT (OUTPATIENT)
Dept: INTERNAL MEDICINE | Facility: CLINIC | Age: 30
End: 2025-02-26

## 2025-02-26 ENCOUNTER — TELEPHONE (OUTPATIENT)
Dept: INTERNAL MEDICINE | Facility: CLINIC | Age: 30
End: 2025-02-26

## 2025-02-26 DIAGNOSIS — R30.0 BURNING WITH URINATION: Primary | ICD-10-CM

## 2025-02-26 DIAGNOSIS — R35.0 URINARY FREQUENCY: ICD-10-CM

## 2025-02-26 LAB
BILIRUB BLD-MCNC: NEGATIVE MG/DL
CLARITY, POC: ABNORMAL
COLOR UR: YELLOW
EXPIRATION DATE: ABNORMAL
GLUCOSE UR STRIP-MCNC: NEGATIVE MG/DL
KETONES UR QL: NEGATIVE
LEUKOCYTE EST, POC: ABNORMAL
Lab: ABNORMAL
NITRITE UR-MCNC: NEGATIVE MG/ML
PH UR: 6 [PH] (ref 5–8)
PROT UR STRIP-MCNC: NEGATIVE MG/DL
RBC # UR STRIP: ABNORMAL /UL
SP GR UR: 1.03 (ref 1–1.03)
UROBILINOGEN UR QL: ABNORMAL

## 2025-02-26 RX ORDER — CEFDINIR 300 MG/1
300 CAPSULE ORAL 2 TIMES DAILY
Qty: 10 CAPSULE | Refills: 0 | Status: SHIPPED | OUTPATIENT
Start: 2025-02-26

## 2025-02-26 NOTE — PROGRESS NOTES
Patient sent a message today that she has pain with urination and urinary frequency that started today.  Patient was advised to come by the clinic and give a urine sample for an automated dipstick and a urine culture was ordered by Dr Marty Jacob.  Patient had abnormal urine and medication was sent to the patient's pharmacy and will be called when results are read.

## 2025-02-26 NOTE — TELEPHONE ENCOUNTER
Caller: Fernanda Vang    Relationship: Self    Best call back number: [phone]     What medication are you requesting: WHATEVER IS RECOMMENDED FOR POSSIBLE UTI    What are your current symptoms: FREQUENT AND PAINFUL URINATION    How long have you been experiencing symptoms: AS OF 2-26-25    Have you had these symptoms before:    [x] Yes  [] No    Have you been treated for these symptoms before:   [x] Yes  [] No    If a prescription is needed, what is your preferred pharmacy and phone number: [facility] #[id number] - [place], [place] - [address]  - [phone] [facility] [phone]      Additional notes: NO AVAILABLE UNTIL FRI 2-28-25

## 2025-02-26 NOTE — TELEPHONE ENCOUNTER
"[employer] messages sent -   \"Oskar Michael,   I tried reaching you via telephone and was unsuccessful -      However, please stop by the office at your convenience today, we do ask if you are here before 4:30 to leave a urine sample.   In which, we will then communicate instructions once the provider reviews your results.      Also, is there any possibility of you being pregnant? Just need to clarify, if antibiotics are warranted.   Thank you!\"      Patient responded to [employer] message -   \"Ok, I can come by today   There is no possibility I am pregnant.\"   "

## 2025-02-28 LAB
BACTERIA UR CULT: NORMAL
BACTERIA UR CULT: NORMAL

## 2025-03-18 ENCOUNTER — OFFICE VISIT (OUTPATIENT)
Dept: INTERNAL MEDICINE | Facility: CLINIC | Age: 30
End: 2025-03-18
Payer: COMMERCIAL

## 2025-03-18 VITALS
HEART RATE: 103 BPM | BODY MASS INDEX: 22.95 KG/M2 | HEIGHT: 67 IN | TEMPERATURE: 97.6 F | WEIGHT: 146.2 LBS | OXYGEN SATURATION: 99 % | DIASTOLIC BLOOD PRESSURE: 72 MMHG | SYSTOLIC BLOOD PRESSURE: 108 MMHG

## 2025-03-18 DIAGNOSIS — F51.01 PRIMARY INSOMNIA: ICD-10-CM

## 2025-03-18 DIAGNOSIS — M25.562 CHRONIC PAIN OF LEFT KNEE: ICD-10-CM

## 2025-03-18 DIAGNOSIS — F41.9 ANXIETY: ICD-10-CM

## 2025-03-18 DIAGNOSIS — Z00.00 ANNUAL PHYSICAL EXAM: Primary | ICD-10-CM

## 2025-03-18 DIAGNOSIS — G89.29 CHRONIC PAIN OF LEFT KNEE: ICD-10-CM

## 2025-03-18 DIAGNOSIS — E78.00 ELEVATED LDL CHOLESTEROL LEVEL: ICD-10-CM

## 2025-03-18 PROCEDURE — 99395 PREV VISIT EST AGE 18-39: CPT

## 2025-03-18 NOTE — PROGRESS NOTES
Subjective   Fernanda Vang is a 29 y.o. female.   Chief Complaint   Patient presents with    Annual Exam     Needs labs - pt is not fasting.     Knee Pain     Patient c/o of L knee pain x 4 months.   Denies swelling, bruising.    States she had a sports injury.   Taken Ibuprofen, applied ice and elevated - gave slight relief.           History of Present Illness  The patient is a 29-year-old female who presents to the office today for her annual physical exam. She has previously had mild elevation of LDL cholesterol. She was treated primarily for anxiety and insomnia. She is on trazodone 50 mg nightly hydroxyzine 25 mg up to three times daily as needed for anxiety.    She reports a history of left knee calcification since middle school, which was not formally diagnosed. She experienced a fall on the same knee in 11/2024, resulting in significant swelling, tenderness, pain, and bruising. Although these symptoms subsided after a few days, she has been unable to perform a full squat since the incident. She retains full range of motion but experiences a catching sensation upon standing and soreness and tenderness after physical activity. She does not report any instability or weakness in the knee. She continues to play soccer and has not sought medical attention for the knee. She reports no popping or clicking sounds from the knee. As an adult, she has not had any issues with the knee, except for an inability to kneel fully during yoga due to tenderness. She reports no other joint issues. She has been avoiding lunging exercises and focusing on hamstring and glute movements, which do not exacerbate her symptoms. She has not used ibuprofen, ice, or heat for symptom management.    She has a history of recurrent urinary tract infections (UTIs), with episodes in 11/2023 and early 02/2024. Over the past decade, she has had 1 UTI, but recently experienced 3 consecutive episodes. She typically develops a yeast infection  "following a UTI, which she attributes to antibiotic use. In 02/2024, she experienced urinary symptoms suggestive of a UTI, but it was diagnosed as a yeast infection. She was not menstruating at the time. She treated the yeast infection with Monistat. In 02/2024, she had another UTI and provided a urine sample. She does not report any correlation between the UTIs and decreased water intake or prolonged urine retention. She currently reports no concerning symptoms such as burning, abnormal discharge, increased frequency, or urgency.    Her menstrual cycles have been regular but shorter, decreasing from a 28-day to a 21-day cycle over the past year.    She experienced elevated stress levels in 01/2024 but not over the year. She continues to find benefit from hydroxyzine, which she takes once in the evening as needed. Her sleep has improved, and she has discontinued trazodone due to side effects. She is up-to-date with her dental and vision care. She does not report any chest pain, shortness of breath, or bowel movement issues.    MEDICATIONS  Current: trazodone, hydroxyzine  Past: Monistat       The following portions of the patient's history were reviewed and updated as appropriate: allergies, current medications, past family history, past medical history, past social history, past surgical history and problem list.    Review of Systems    Objective   Past Medical History:   Diagnosis Date    Headache       History reviewed. No pertinent surgical history.     Current Outpatient Medications:     hydrOXYzine (ATARAX) 25 MG tablet, Take 1 tablet by mouth At Night As Needed for Anxiety., Disp: 90 tablet, Rfl: 3      /72 (BP Location: Left arm, Patient Position: Sitting, Cuff Size: Adult)   Pulse 103   Temp 97.6 °F (36.4 °C) (Temporal)   Ht 170.2 cm (67\")   Wt 66.3 kg (146 lb 3.2 oz)   LMP 03/05/2025   SpO2 99%   BMI 22.90 kg/m²      Body mass index is 22.9 kg/m².  BMI is within normal parameters. No other " follow-up for BMI required.       Physical Exam  Vitals and nursing note reviewed.   Constitutional:       General: She is not in acute distress.     Appearance: Normal appearance. She is normal weight. She is not ill-appearing, toxic-appearing or diaphoretic.   HENT:      Head: Normocephalic and atraumatic.      Right Ear: Tympanic membrane, ear canal and external ear normal. There is no impacted cerumen.      Left Ear: Tympanic membrane, ear canal and external ear normal. There is no impacted cerumen.      Nose: Nose normal.   Eyes:      Extraocular Movements: Extraocular movements intact.      Conjunctiva/sclera: Conjunctivae normal.      Pupils: Pupils are equal, round, and reactive to light.   Neck:      Thyroid: No thyroid mass, thyromegaly or thyroid tenderness.      Vascular: No carotid bruit.   Cardiovascular:      Rate and Rhythm: Regular rhythm. Tachycardia present.      Pulses: Normal pulses.      Heart sounds: Normal heart sounds.   Pulmonary:      Effort: Pulmonary effort is normal.      Breath sounds: Normal breath sounds.   Abdominal:      General: Bowel sounds are normal.      Palpations: Abdomen is soft.   Musculoskeletal:         General: Tenderness (left knee) present. Normal range of motion.      Cervical back: Normal range of motion and neck supple.      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 No swelling or crepitus. Tenderness present. Normal pulse.      Right lower leg: No edema.      Left lower leg: No edema.   Skin:     General: Skin is warm and dry.   Neurological:      General: No focal deficit present.      Mental Status: She is alert and oriented to person, place, and time. Mental status is at baseline.   Psychiatric:         Mood and Affect: Mood normal.         Behavior: Behavior normal.         Thought Content: Thought content normal.         Judgment: Judgment normal.                Assessment & Plan   Diagnoses and all orders for this visit:    1. Annual physical exam (Primary)  -     Urinalysis With Microscopic - Urine, Clean Catch; Future  -     TSH Rfx On Abnormal To Free T4; Future  -     Lipid Panel; Future  -     Comprehensive Metabolic Panel; Future  -     CBC & Differential; Future    2. Elevated LDL cholesterol level  -     Lipid Panel; Future    3. Anxiety    4. Primary insomnia    5. Chronic pain of left knee  -     Ambulatory Referral to Sports Medicine                 Assessment & Plan  1. Left knee pain.  A referral to a [facility] medicine specialist at [facility] will be initiated for further evaluation and management.    2. Recurrent urinary tract infections (UTIs).  She is advised to provide a urine specimen for culture if symptoms recur. If the culture is negative, a vaginal swab will be recommended to check for yeast, bacterial vaginosis (BV), and other potential causes of similar symptoms.    3. Anxiety.  She will continue her current regimen of hydroxyzine 25 mg up to three times daily as needed for anxiety. The trazodone 50 mg nightly will be discontinued due to side effects and lack of benefit.    4. Insomnia.  She reports improved sleep and will continue with her current management plan without trazodone.    5. Health maintenance.  Comprehensive lab orders, including urinalysis and microscopic examination, will be placed. She is advised to complete these labs at her convenience, preferably fasting, and to ensure no spotting/period is present during the urinalysis to follow up on previous hematuria with negative urine culture.    Patient or patient representative verbalized consent for the use of Ambient Listening during the visit with  JOSE Savage for chart documentation. 3/18/2025  15:28 CDT    Please note that portions of this note were completed with a voice recognition program.     Electronically signed by JOSE Savage, 03/18/25,  15:30 CDT.

## 2025-03-27 ENCOUNTER — TELEPHONE (OUTPATIENT)
Age: 30
End: 2025-03-27
Payer: COMMERCIAL

## 2025-03-27 DIAGNOSIS — M25.562 LEFT KNEE PAIN, UNSPECIFIED CHRONICITY: Primary | ICD-10-CM

## 2025-03-27 NOTE — TELEPHONE ENCOUNTER
Patient informed of X-ray order by Dr. Hdez at [facility]. Patient to arrive 30 minutes before appointment at [address] outpatient lab and imaging.

## 2025-03-28 ENCOUNTER — CLINICAL SUPPORT (OUTPATIENT)
Dept: INTERNAL MEDICINE | Facility: CLINIC | Age: 30
End: 2025-03-28
Payer: COMMERCIAL

## 2025-03-28 DIAGNOSIS — R82.90 CLOUDY URINE: Primary | ICD-10-CM

## 2025-03-28 DIAGNOSIS — R82.90 ABNORMAL URINE: ICD-10-CM

## 2025-03-28 LAB
BILIRUB BLD-MCNC: NEGATIVE MG/DL
CLARITY, POC: ABNORMAL
COLOR UR: YELLOW
EXPIRATION DATE: ABNORMAL
GLUCOSE UR STRIP-MCNC: NEGATIVE MG/DL
KETONES UR QL: NEGATIVE
LEUKOCYTE EST, POC: NEGATIVE
Lab: ABNORMAL
NITRITE UR-MCNC: NEGATIVE MG/ML
PH UR: 5.5 [PH] (ref 5–8)
PROT UR STRIP-MCNC: NEGATIVE MG/DL
RBC # UR STRIP: ABNORMAL /UL
SP GR UR: 1.02 (ref 1–1.03)
UROBILINOGEN UR QL: ABNORMAL

## 2025-03-28 NOTE — PROGRESS NOTES
Patient messaged JOSE Verdugo, that she is having UTI symptoms of pelvic pressure and vaginal irritation now when urinating after having abnormal results in her urine labs from 03/24/2025. Urine was retested today with trace blood and slightly cloudy findings. Justine Knight asked for a lab culture to be done after looking at results.

## 2025-03-30 LAB
BACTERIA UR CULT: NORMAL
BACTERIA UR CULT: NORMAL

## 2025-03-31 ENCOUNTER — OFFICE VISIT (OUTPATIENT)
Age: 30
End: 2025-03-31
Payer: COMMERCIAL

## 2025-03-31 ENCOUNTER — HOSPITAL ENCOUNTER (OUTPATIENT)
Dept: GENERAL RADIOLOGY | Facility: HOSPITAL | Age: 30
Discharge: HOME | End: 2025-03-31
Payer: COMMERCIAL

## 2025-03-31 VITALS — WEIGHT: 146 LBS | BODY MASS INDEX: 22.91 KG/M2 | HEIGHT: 67 IN

## 2025-03-31 DIAGNOSIS — Z87.39 HISTORY OF OSGOOD-SCHLATTER DISEASE: ICD-10-CM

## 2025-03-31 DIAGNOSIS — M25.562 LEFT KNEE PAIN, UNSPECIFIED CHRONICITY: ICD-10-CM

## 2025-03-31 DIAGNOSIS — S82.155S: Primary | ICD-10-CM

## 2025-03-31 PROCEDURE — 73565 X-RAY EXAM OF KNEES: CPT

## 2025-03-31 PROCEDURE — 73562 X-RAY EXAM OF KNEE 3: CPT

## 2025-03-31 PROCEDURE — 99203 OFFICE O/P NEW LOW 30 MIN: CPT | Performed by: STUDENT IN AN ORGANIZED HEALTH CARE EDUCATION/TRAINING PROGRAM

## 2025-03-31 NOTE — PROGRESS NOTES
[facility]  Emerson Hdez MD, PhD  Brendan Hdez PA-C    CHIEF COMPLAINT  Initial Evaluation of the Left Knee (Patient presents today for left knee pain. X-rays performed at [facility] on 03/31/2025. Patient states knee pain started in November after falling on knee while playing soccer. Patient has had a history of left knee pain for the past 15 years. No surgical history. No physical therapy.)       HISTORY OF PRESENT ILLNESS    History of Present Illness  The patient is a 29-year-old female, new patient, who presents for evaluation of left knee pain.    She sustained an injury to her left knee during a soccer game in 11/2024, characterized by a direct fall onto the anterior aspect of the knee. The initial impact resulted in significant swelling, difficulty in fully extending the knee, and weight-bearing challenges. Although the acute symptoms resolved relatively quickly, she has been unable to regain full function of the knee. Her occupation in a kitchen requires frequent squatting, which now causes a catching sensation or pain upon standing. Despite her love for sports and regular workouts, she has been unable to perform lunges or deep flexion exercises. She can do squats and hamstring exercises but not quadriceps-oriented activities. The pain is localized beneath the patella, particularly during upward movements, and is followed by soreness the next day. Occasionally, she experiences difficulty in fully extending the knee after squatting but reports no instances of the knee giving way. She reports no other major injuries to the knee, surgical interventions, or injections. Her primary physical activities include soccer and weightlifting at the gym, typically in the evenings. She also reports a pre-existing prominence on the same knee from her middle school years, which was tender to touch and prevented her from kneeling fully. However, this did not cause significant issues  until the recent fall.    SOCIAL HISTORY  She works in a kitchen at [employer]       HISTORY    Current Outpatient Medications   Medication Instructions    hydrOXYzine (ATARAX) 25 mg, Oral, Nightly PRN         reports that she has never smoked. She has never used smokeless tobacco. She reports current alcohol use of about 5.0 standard drinks of alcohol per week. She reports that she does not use drugs.    Past Medical History:   Diagnosis Date    Headache         No past surgical history on file.     PHYSICAL EXAM  Constitutional: The patient is in no apparent distress and generally well-appearing. The patient hears me clearly and answers questions appropriately.   Musculoskeletal:  Physical Exam  Knee exam was performed.      IMAGING    XR Knee 3+ View With Sunrise Left  Result Date: 3/31/2025  Narrative: EXAMINATION:  XR KNEE 3+ VW W SUNRISE LEFT-  3/31/2025 11:56 AM  HISTORY: M25.562-Pain in left knee.  COMPARISON: No comparison study.  TECHNIQUE: 4 views were obtained.  FINDINGS: There may be mild narrowing of the medial compartment. There is no spurring. There is a fragmented and hypertrophied appearance of the tibial tuberosity seen best on the lateral image. There does appear to be some soft tissue swelling anterior to the tibial tuberosity.       Impression: 1. Hypertrophic and fragmented appearance of the tibial tuberosity likely representing old Osgood-Schlatter disease. 2. There may be some soft tissue swelling over the tibial tuberosity that could be related to injury or inflammation. 3. Question of mild narrowing of the medial compartment. No spurring. On the sunrise view, there may be a very mild degree of lateral patellar subluxation.   This report was signed and finalized on 3/31/2025 3:32 PM by Dr. Antonio Triana MD.      XR Knee Bilateral AP Standing  Result Date: 3/31/2025  Narrative: EXAMINATION:  XR KNEE BILATERAL AP STANDING-  3/31/2025 12:02 PM  HISTORY: M25.562-Pain in left knee.  COMPARISON:  No comparison study.  TECHNIQUE: Standing AP images were obtained of both knees. Gleamer AI fracture analysis was utilized.  FINDINGS: There may be very mild narrowing of the medial compartment of the left knee when compared to the right knee. The lateral compartments are symmetric and fairly well-maintained bilaterally. There is no evidence of acute fracture on AP imaging. There is a fragmented appearance of the tibial tuberosity seen better on the lateral image of the left knee.       Impression: 1. Probable mild narrowing of the medial compartment left knee compared to the right side on standing AP image. 2. Fragmented appearance of the tibial tuberosity on the left seen better on the lateral image of the left knee.  This report was signed and finalized on 3/31/2025 3:28 PM by Dr. Antonio Triana MD.         Results  Imaging  X-rays above personally reviewed and interpreted.   Four view knee x-rays show no acute fracture, significant calcification over the tibial tubercle suggestive of old Osgood-Schlatter disease with fragmentation, no significant degenerative changes of the knee and no loss of joint space.       ASSESSMENT & PLAN  Diagnoses and all orders for this visit:    1. Closed nondisplaced fracture of left tibial tuberosity, sequela (Primary)  -     Ambulatory Referral to Physical Therapy for Evaluation & Treatment    2. History of Osgood-Schlatter disease  -     Ambulatory Referral to Physical Therapy for Evaluation & Treatment    Patient had Osgood Ortiz disease as a child and then last fall had a fall while playing soccer and landed directly onto her tibial tubercle which I believe resulted in fracturing of the tibial tuberosity and the associated residual ossicle.  She continues to have pain in this area.  She still has an intact extensor mechanism but she may also have associated patellar tendinosis or partial tear.  I recommended physical therapy as an initial treatment for her.  If this does  not improve her symptoms we discussed potential referral for surgical excision of the residual ossicles.  Other treatment modalities that were discussed include PRP injection, shockwave therapy, topical nitro.  If she does not improve with PT we may consider getting an MRI of her knee to further evaluate the health of the associated tendon.      PT referral  Consider MRI versus orthopedic surgery referral if not improving  Follow up: As needed        Patient or patient representative verbalized consent for the use of Ambient Listening during the visit with  Emerson Hdez MD for chart documentation. 3/31/2025  16:06 CDT    Emerson Hdez MD, PhD

## 2025-04-11 ENCOUNTER — OFFICE VISIT (OUTPATIENT)
Dept: INTERNAL MEDICINE | Facility: CLINIC | Age: 30
End: 2025-04-11
Payer: COMMERCIAL

## 2025-04-11 VITALS
HEART RATE: 76 BPM | BODY MASS INDEX: 23.07 KG/M2 | RESPIRATION RATE: 16 BRPM | TEMPERATURE: 97.8 F | DIASTOLIC BLOOD PRESSURE: 70 MMHG | HEIGHT: 67 IN | OXYGEN SATURATION: 99 % | WEIGHT: 147 LBS | SYSTOLIC BLOOD PRESSURE: 114 MMHG

## 2025-04-11 DIAGNOSIS — N89.8 VAGINAL IRRITATION: Primary | ICD-10-CM

## 2025-04-11 DIAGNOSIS — N89.8 VAGINAL IRRITATION: ICD-10-CM

## 2025-04-11 NOTE — PROGRESS NOTES
"Chief Complaint  Vaginal Itching (Patient states she has vaginal itching with some discomfort X 1.5 weeks.)    Subjective    History of Present Illness      Patient presents to [facility] for      Patient presents today with vaginal itching and discomfort.  She left a urine specimen on 3/28/2025.  Since urine was unremarkable and symptoms are still persisting she came in today for a vaginal swab to assess for bacterial vaginosis or candidiasis infection.  She is not having any foul odor or discharge. No systemic symptoms.  Symptoms have persisted for over a week and a half.    Review of Systems   Genitourinary:         Vaginal irritation   All other systems reviewed and are negative.      I have reviewed and agree with the HPI and ROS information as above.  Vanessa Weaver, APRN     Objective   Vital Signs:   /70 (BP Location: Left arm, Patient Position: Sitting, Cuff Size: Adult)   Pulse 76   Temp 97.8 °F (36.6 °C) (Infrared)   Resp 16   Ht 170.2 cm (67\")   Wt 66.7 kg (147 lb)   SpO2 99%   BMI 23.02 kg/m²     BMI is within normal parameters. No other follow-up for BMI required.      Physical Exam  Vitals and nursing note reviewed. Exam conducted with a chaperone present.   Constitutional:       Appearance: Normal appearance. She is normal weight.   Cardiovascular:      Rate and Rhythm: Normal rate and regular rhythm.      Heart sounds: Normal heart sounds. No murmur heard.  Pulmonary:      Effort: Pulmonary effort is normal.      Breath sounds: Normal breath sounds.   Abdominal:      Hernia: There is no hernia in the left inguinal area or right inguinal area.   Genitourinary:     Labia:         Right: No rash, tenderness or injury.         Left: No rash, tenderness or injury.       Vagina: Normal. No signs of injury. No vaginal discharge, erythema or bleeding.   Musculoskeletal:      Cervical back: Normal range of motion.   Lymphadenopathy:      Lower Body: No right inguinal " adenopathy. No left inguinal adenopathy.   Skin:     General: Skin is warm and dry.   Neurological:      Mental Status: She is alert and oriented to person, place, and time. Mental status is at baseline.   Psychiatric:         Mood and Affect: Mood normal.         Behavior: Behavior normal.         Thought Content: Thought content normal.         Judgment: Judgment normal.            Result Review  Data Reviewed:          Assessment and Plan      Physical exam and vital signs are reassuring.  There is no foul odor, discharge, or pain.  Patient reports itching, discomfort, and irritation.  Urine culture on 3/28/2025 negative for abnormal growth therefore there is no urinary tract infection.  Since her symptoms have persisted we did a vaginal swab in office today.  This will assess for BV, yeast infection, and trichomonas.  Plan of care will be determined based on swab results.    Diagnoses and all orders for this visit:    1. Vaginal irritation (Primary)  -     NuSwab VG+ - Swab, Vagina; Future        Follow Up   Return if symptoms worsen or fail to improve.  Patient was given instructions and counseling regarding her condition or for health maintenance advice. Please see specific information pulled into the AVS if appropriate.

## 2025-04-14 LAB
A VAGINAE DNA VAG QL NAA+PROBE: NORMAL SCORE
BVAB2 DNA VAG QL NAA+PROBE: NORMAL SCORE
C ALBICANS DNA VAG QL NAA+PROBE: NEGATIVE
C GLABRATA DNA VAG QL NAA+PROBE: NEGATIVE
C TRACH DNA SPEC QL NAA+PROBE: NEGATIVE
MEGA1 DNA VAG QL NAA+PROBE: NORMAL SCORE
N GONORRHOEA DNA VAG QL NAA+PROBE: NEGATIVE
T VAGINALIS DNA VAG QL NAA+PROBE: NEGATIVE